# Patient Record
Sex: MALE | Race: BLACK OR AFRICAN AMERICAN | NOT HISPANIC OR LATINO | URBAN - METROPOLITAN AREA
[De-identification: names, ages, dates, MRNs, and addresses within clinical notes are randomized per-mention and may not be internally consistent; named-entity substitution may affect disease eponyms.]

---

## 2021-03-28 ENCOUNTER — EMERGENCY (EMERGENCY)
Facility: HOSPITAL | Age: 21
LOS: 0 days | Discharge: HOME | End: 2021-03-28
Payer: MEDICAID

## 2021-03-28 VITALS
OXYGEN SATURATION: 99 % | DIASTOLIC BLOOD PRESSURE: 73 MMHG | WEIGHT: 184.97 LBS | SYSTOLIC BLOOD PRESSURE: 114 MMHG | HEIGHT: 72 IN | TEMPERATURE: 98 F | HEART RATE: 80 BPM | RESPIRATION RATE: 16 BRPM

## 2021-03-28 DIAGNOSIS — M25.50 PAIN IN UNSPECIFIED JOINT: ICD-10-CM

## 2021-03-28 DIAGNOSIS — M25.512 PAIN IN LEFT SHOULDER: ICD-10-CM

## 2021-03-28 PROCEDURE — 73030 X-RAY EXAM OF SHOULDER: CPT | Mod: 26,LT

## 2021-03-28 PROCEDURE — 99284 EMERGENCY DEPT VISIT MOD MDM: CPT

## 2021-03-28 RX ORDER — ACETAMINOPHEN 500 MG
975 TABLET ORAL ONCE
Refills: 0 | Status: COMPLETED | OUTPATIENT
Start: 2021-03-28 | End: 2021-03-28

## 2021-03-28 RX ADMIN — Medication 975 MILLIGRAM(S): at 22:36

## 2021-03-28 RX ADMIN — Medication 975 MILLIGRAM(S): at 22:38

## 2021-03-28 NOTE — ED PROVIDER NOTE - CARE PROVIDERS DIRECT ADDRESSES
[email or web address]

## 2021-03-28 NOTE — ED PROVIDER NOTE - NSFOLLOWUPINSTRUCTIONS_ED_ALL_ED_FT
Shoulder Pain    Many things can cause shoulder pain, including:    An injury.  Moving the arm in the same way again and again (overuse).  Joint pain (arthritis).    Follow these instructions at home:  Take these actions to help with your pain:    Squeeze a soft ball or a foam pad as much as you can. This helps to prevent swelling. It also makes the arm stronger.  Take over-the-counter and prescription medicines only as told by your doctor.  If told, put ice on the area:    Put ice in a plastic bag.  Place a towel between your skin and the bag.  Leave the ice on for 20 minutes, 2–3 times per day. Stop putting on ice if it does not help with the pain.    If you were given a shoulder sling or immobilizer:    Wear it as told.  Remove it to shower or bathe.  Move your arm as little as possible.  Keep your hand moving. This helps prevent swelling.      Contact a doctor if:  Your pain gets worse.  Medicine does not help your pain.  You have new pain in your arm, hand, or fingers.  Get help right away if:  Your arm, hand, or fingers:    Tingle.  Are numb.  Are swollen.  Are painful.  Turn white or blue.    This information is not intended to replace advice given to you by your health care provider. Make sure you discuss any questions you have with your health care provider.

## 2021-03-28 NOTE — ED PROVIDER NOTE - PATIENT PORTAL LINK FT
You can access the FollowMyHealth Patient Portal offered by [facility] by registering at the following website: [email or web address]. By joining [employer]’s FollowMyHealth portal, you will also be able to view your health information using other applications (apps) compatible with our system.

## 2021-03-28 NOTE — ED PROCEDURE NOTE - CPROC ED POST RADIOGRAPHY1
post-procedure radiography performed/post procedure radiography not performed/extremity correctly positioned, splinted
[facility]

## 2021-03-28 NOTE — ED PROVIDER NOTE - PROVIDER TOKENS
PROVIDER:[TOKEN:[[id number]],FOLLOWUP:[1-3 Days]],PROVIDER:[TOKEN:[[id number]],FOLLOWUP:[1-3 Days]]

## 2021-03-28 NOTE — ED PROVIDER NOTE - MUSCULOSKELETAL MINIMAL EXAM
+ TTP left anterior shoulder joint with decreased ROM due to pain. no chest wall or clavicle tenderness. radial pulses equal and intact bilaterally. UE strength 5/5 bilaterally.

## 2021-03-28 NOTE — ED PROVIDER NOTE - CLINICAL SUMMARY MEDICAL DECISION MAKING FREE TEXT BOX
21 yo male with no significant pmh presents to the ED c/o left shoulder pain gradual worse x 2 months. no fall or trauma. 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 Pain constant, worse with movement, nonradiating. Denies fever, chills, chest pain, sob, abd pain, N/V, UE/LE weakness or paresthesias, neck or back pain. Patient was seen previously for similar pain however states he never followed up with ortho. no previous imaging. no previous shoulder surgery. PE: as above. A/P: shoulder xray, sling, nsaids (offered in ED, declines at this time) follow up with ortho.

## 2021-03-28 NOTE — ED PROVIDER NOTE - OBJECTIVE STATEMENT
19 yo male with no significant pmh presents to the ED c/o left shoulder pain gradual worse x 2 months. no fall or trauma. 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 Pain constant, worse with movement, nonradiating. Denies fever, chills, chest pain, sob, abd pain, N/V, UE/LE weakness or paresthesias, neck or back pain. Patient was seen previously for similar pain however states he never followed up with ortho. no previous imaging. no previous shoulder surgery.

## 2021-03-28 NOTE — ED PROVIDER NOTE - CARE PROVIDER_API CALL
Félix Morel)  Orthopaedic Surgery  [address], [address]  [place] [address]  Phone: [phone]  Fax: [phone]  Follow Up Time: 1-3 Days    Sid Hahn)  Orthopaedic Surgery; Sports Medicine  [address], [address]  [place] [address]  Phone: [phone]  Fax: [phone]  Follow Up Time: 1-3 Days
